# Patient Record
Sex: MALE | Race: WHITE | Employment: UNEMPLOYED | URBAN - METROPOLITAN AREA
[De-identification: names, ages, dates, MRNs, and addresses within clinical notes are randomized per-mention and may not be internally consistent; named-entity substitution may affect disease eponyms.]

---

## 2018-07-24 ENCOUNTER — HOSPITAL ENCOUNTER (OUTPATIENT)
Dept: ULTRASOUND IMAGING | Age: 48
Discharge: HOME | End: 2018-07-26
Payer: MEDICAID

## 2018-07-24 DIAGNOSIS — I82.90 THROMBOSIS: ICD-10-CM

## 2018-07-24 PROCEDURE — 93970 EXTREMITY STUDY: CPT

## 2019-02-04 ENCOUNTER — HOSPITAL ENCOUNTER (OUTPATIENT)
Dept: ULTRASOUND IMAGING | Age: 49
Discharge: HOME | End: 2019-02-06
Payer: MEDICAID

## 2019-02-04 DIAGNOSIS — R09.89 BRUIT OF LEFT CAROTID ARTERY: ICD-10-CM

## 2019-02-04 PROCEDURE — 93880 EXTRACRANIAL BILAT STUDY: CPT

## 2019-02-14 ENCOUNTER — OFFICE VISIT (OUTPATIENT)
Dept: VASCULAR SURGERY | Age: 49
End: 2019-02-14
Payer: MEDICAID

## 2019-02-14 VITALS — DIASTOLIC BLOOD PRESSURE: 82 MMHG | HEART RATE: 84 BPM | SYSTOLIC BLOOD PRESSURE: 126 MMHG

## 2019-02-14 DIAGNOSIS — I65.21 CAROTID STENOSIS, RIGHT: ICD-10-CM

## 2019-02-14 DIAGNOSIS — F17.200 TOBACCO DEPENDENCE: ICD-10-CM

## 2019-02-14 PROCEDURE — 99204 OFFICE O/P NEW MOD 45 MIN: CPT | Performed by: SURGERY

## 2019-02-14 PROCEDURE — G8421 BMI NOT CALCULATED: HCPCS | Performed by: SURGERY

## 2019-02-14 PROCEDURE — 1036F TOBACCO NON-USER: CPT | Performed by: SURGERY

## 2019-02-14 PROCEDURE — G8427 DOCREV CUR MEDS BY ELIG CLIN: HCPCS | Performed by: SURGERY

## 2019-02-14 PROCEDURE — G8598 ASA/ANTIPLAT THER USED: HCPCS | Performed by: SURGERY

## 2019-02-14 PROCEDURE — G8484 FLU IMMUNIZE NO ADMIN: HCPCS | Performed by: SURGERY

## 2019-02-14 RX ORDER — METHIMAZOLE 5 MG/1
30 TABLET ORAL 2 TIMES DAILY
COMMUNITY
Start: 2019-01-16

## 2019-02-14 RX ORDER — TIOTROPIUM BROMIDE AND OLODATEROL 3.124; 2.736 UG/1; UG/1
SPRAY, METERED RESPIRATORY (INHALATION)
COMMUNITY
Start: 2019-01-28 | End: 2019-09-10 | Stop reason: CLARIF

## 2019-02-14 RX ORDER — NICOTINE 21 MG/24HR
PATCH, TRANSDERMAL 24 HOURS TRANSDERMAL
Refills: 2 | COMMUNITY
Start: 2019-02-06

## 2019-02-14 RX ORDER — ASPIRIN 81 MG/1
81 TABLET ORAL DAILY
COMMUNITY

## 2020-02-13 ENCOUNTER — OFFICE VISIT (OUTPATIENT)
Dept: VASCULAR SURGERY | Age: 50
End: 2020-02-13
Payer: MEDICAID

## 2020-02-13 PROBLEM — Z87.891 HISTORY OF TOBACCO USE: Status: ACTIVE | Noted: 2020-02-13

## 2020-02-13 PROBLEM — F17.200 TOBACCO DEPENDENCE: Status: RESOLVED | Noted: 2019-02-14 | Resolved: 2020-02-13

## 2020-02-13 PROCEDURE — G8420 CALC BMI NORM PARAMETERS: HCPCS | Performed by: SURGERY

## 2020-02-13 PROCEDURE — 1036F TOBACCO NON-USER: CPT | Performed by: SURGERY

## 2020-02-13 PROCEDURE — 99213 OFFICE O/P EST LOW 20 MIN: CPT | Performed by: SURGERY

## 2020-02-13 PROCEDURE — G8484 FLU IMMUNIZE NO ADMIN: HCPCS | Performed by: SURGERY

## 2020-02-13 PROCEDURE — G8427 DOCREV CUR MEDS BY ELIG CLIN: HCPCS | Performed by: SURGERY

## 2020-02-13 NOTE — PROGRESS NOTES
Chief Complaint:   Chief Complaint   Patient presents with    Circulatory Problem     Follow up carotid stenosis         HPI: Patient came to the office, for the evaluation of carotid artery disease, overall doing well, except for breathing problem, due to chronic obstructive lung disease, stop smoking      Patient denies any focal lateralizing neurological symptoms like loss of speech, vision or loss of function of extremity    Patient can walk a few blocks , and denies any symptoms of rest pain    Allergies   Allergen Reactions    Ciprofloxacin Hives    Keflex [Cephalexin] Hives    Penicillins        Current Outpatient Medications   Medication Sig Dispense Refill    Albuterol (VENTOLIN IN) Inhale into the lungs      albuterol-ipratropium (COMBIVENT RESPIMAT)  MCG/ACT AERS inhaler Inhale 1 puff into the lungs every 6 hours      methimazole (TAPAZOLE) 5 MG tablet Take 5 mg by mouth 2 times daily      nicotine (NICODERM CQ) 14 MG/24HR   2    aspirin 81 MG EC tablet Take 81 mg by mouth daily      naproxen (NAPROSYN) 500 MG tablet Take 1 tablet by mouth 2 times daily for 7 days (Patient not taking: Reported on 2/13/2020) 14 tablet 0    albuterol (PROVENTIL) (2.5 MG/3ML) 0.083% nebulizer solution Take 2.5 mg by nebulization every 6 hours as needed. No current facility-administered medications for this visit. Past Medical History:   Diagnosis Date    Aortic valve stenosis     Carotid stenosis, right 2/14/2019    COPD (chronic obstructive pulmonary disease) (HCC)     Graves disease     History of tobacco use 2/13/2020    Tobacco dependence 2/14/2019       Past Surgical History:   Procedure Laterality Date    TONSILLECTOMY         History reviewed. No pertinent family history.     Social History     Socioeconomic History    Marital status:      Spouse name: Not on file    Number of children: Not on file    Years of education: Not on file    Highest education level: Not on organomegaly. Musculoskeletal : No joint effusions, tenderness swelling    Neuro: Speech is intact. Moving all extremities. No focal motor or sensory deficits      Extremities:  Both feet are warm to touch. The color of both feet is normal.        Pulses Right  Left    Brachial 3 3    Radial    3=normal   Femoral 2 2  2=diminished   Popliteal    1=barely palpable   Dorsalis pedis    0=absent   Posterior tibial    4=aneurysmal             Other pertinent information:1. The past medical records were reviewed. Assessment:    1. Carotid stenosis, right    2. History of tobacco use              Plan:       Discussed with the patient, recommended follow-up carotid ultrasound and call me PRN if any neurological symptoms              Patient was instructed to continue walking program and to call if any worsening of symptoms and to call if any focal lateralizing neurological symptoms like loss of speech, vision or loss of function of extremity. All the questions were answered. Orders Placed This Encounter   Procedures    US Carotid Artery Bilateral             Indicated follow-up: Return in about 1 year (around 2/13/2021), or if symptoms worsen or fail to improve.

## 2020-03-02 ENCOUNTER — TELEPHONE (OUTPATIENT)
Dept: VASCULAR SURGERY | Age: 50
End: 2020-03-02

## 2020-03-02 ENCOUNTER — HOSPITAL ENCOUNTER (OUTPATIENT)
Dept: CARDIOLOGY | Age: 50
Discharge: HOME | End: 2020-03-02
Payer: MEDICAID

## 2020-03-02 PROCEDURE — 93880 EXTRACRANIAL BILAT STUDY: CPT

## 2020-03-02 NOTE — PROGRESS NOTES
[facility] - [facility]    This is a pre read worksheet - prior to official physician interpretation    Martha Deven  1970  Date of study: 3/2/20  [id number]    Indication for study:  Carotid artery stenosis  Study : Bilateral Carotid Artery Duplex Examination    Duplex examination of the RIGHT carotid artery system identifies atherosclerotic plaque. The peak systolic velocity in internal carotid artery was 173 centimeters / second. The maximum end diastolic velocity was 49 centimeters / second. The ICA/CCA ratio is 1.1. The right vertebral artery has antegrade flow. Duplex examination of the LEFT carotid artery system identifies atherosclerotic plaque. The peak systolic velocity in internal carotid artery was 153 centimeters / second. The maximum end diastolic velocity was 53 centimeters / second. The ICA/CCA ratio is 1.0. The left vertebral artery has antegrade flow.     RIGHT 20%  LEFT < 20%   Both mid- distal ICAS very tortuous      LAST STUDY  2/4/2019

## 2020-03-02 NOTE — TELEPHONE ENCOUNTER
Message left for the patient regarding the carotid ultrasound testings, normal, call office to discuss further, no need for follow-up testing in the future unless patient has symptoms but to call office to discuss

## 2020-03-03 ENCOUNTER — TELEPHONE (OUTPATIENT)
Dept: VASCULAR SURGERY | Age: 50
End: 2020-03-03

## 2020-03-07 ENCOUNTER — TELEPHONE (OUTPATIENT)
Dept: VASCULAR SURGERY | Age: 50
End: 2020-03-07

## 2020-03-08 NOTE — PROCEDURES
[address]                  [address] [facility]  [facility]                                VASCULAR REPORT    PATIENT NAME: Lamberto Ulrich                   :        1970  MED REC NO:   [id number]                            ROOM:  ACCOUNT NO:   [de-identified]                           ADMIT DATE: 2020  PROVIDER:     Germaine Healy MD    DATE OF PROCEDURE:  2020    CAROTID ULTRASOUND REPORT    INDICATION:  Carotid bruit. FINDINGS:  Duplex scanning of the right carotid artery revealed the  patient has moderate intimal thickening with a peak internal carotid  velocity of 044, diastolic velocity of 49 cm/sec with tortous right  internal carotid artery, with stenosis of 20%. Duplex scanning of the left carotid artery revealed again mild intimal  thickening with a peak internal carotid velocity of 548, diastolic  velocity of 53 cm/sec with minimal stenosis of less than 20%. IMPRESSION:  Right carotid artery stenosis of 20%, left carotid artery  stenosis of less than 20%, increased velocity due to combination of  markedly tortous internal carotid arteries. Freddy Garcia MD    D: 2020 16:40:05       T: 2020 20:04:59     [email or web address]  Job#: [id number]     Doc#: [id number]    CC:   Britta Schroeder Iii, DO

## 2022-04-12 ENCOUNTER — HOSPITAL ENCOUNTER (EMERGENCY)
Age: 52
Discharge: HOME | End: 2022-04-12
Payer: MEDICAID

## 2022-04-12 ENCOUNTER — APPOINTMENT (OUTPATIENT)
Dept: ULTRASOUND IMAGING | Age: 52
End: 2022-04-12
Payer: MEDICAID

## 2022-04-12 VITALS
WEIGHT: 159 LBS | DIASTOLIC BLOOD PRESSURE: 66 MMHG | RESPIRATION RATE: 16 BRPM | OXYGEN SATURATION: 98 % | TEMPERATURE: 97.8 F | HEIGHT: 72 IN | SYSTOLIC BLOOD PRESSURE: 143 MMHG | BODY MASS INDEX: 21.54 KG/M2 | HEART RATE: 103 BPM

## 2022-04-12 DIAGNOSIS — R60.0 EDEMA OF LEFT LOWER EXTREMITY: Primary | ICD-10-CM

## 2022-04-12 PROCEDURE — 93971 EXTREMITY STUDY: CPT

## 2022-04-12 PROCEDURE — 99281 EMR DPT VST MAYX REQ PHY/QHP: CPT

## 2022-04-12 NOTE — Clinical Note
Sherri Tirado was seen and treated in our emergency department on 4/12/2022. He may return to work on 04/13/2022. If you have any questions or concerns, please don't hesitate to call.       Eun Rodriguez PA-C

## 2022-04-13 NOTE — ED PROVIDER NOTES
[facility]     Department of Emergency Medicine   ED  Encounter Note  Admit Date/RoomTime: No admission date for patient encounter. ED Room: Room/bed info not found    NAME: Mathew Rutledge  : 1970  MRN: [id number]     Chief Complaint:  Leg Swelling (LLE x1 week, denies injury and pain)    History of Present Illness       Mathew Rutledge is a 54 y.o. old male who presents to the emergency department by private vehicle, for non-traumatic Left foot and ankle swelling which occured 1 week(s) prior to arrival.   The complaint is due to no known cause. His weight bearing status is normal.  Patient has no prior history of pain/injury with regards to today's visit. Since onset the symptoms have been remaining constant. His pain is aggraveated by nothing and relieved by nothing. He denies any head injury, headache, loss of consciousness, confusion, dizziness, neck pain, chest pain, abdominal pain, back pain, extremity injury, numbness, weakness, blurred vision, nausea, vomiting, fever, chills, wounds or rash. ROS   Pertinent positives and negatives are stated within HPI, all other systems reviewed and are negative. Past Medical History:  has a past medical history of Aortic valve stenosis, Carotid stenosis, right, COPD (chronic obstructive pulmonary disease) ([address] Graves disease, History of tobacco use, and Tobacco dependence. Surgical History:  has a past surgical history that includes Tonsillectomy. Social History:  reports that he quit smoking about 3 years ago. His smoking use included cigarettes. He smoked 0.50 packs per day. He has never used smokeless tobacco. He reports that he does not drink alcohol and does not use drugs. Family History: family history is not on file.      Allergies: Ciprofloxacin, Keflex [cephalexin], and Penicillins    Physical Exam   Oxygen Saturation Interpretation: Normal.        ED Triage Vitals   BP Temp Temp src Pulse Resp SpO2 Height Weight   -- -- -- -- -- -- -- --         Constitutional:  Alert, development consistent with age. HEENT:  NC/NT. Airway patent. Neck:  Normal ROM. Supple. Left Lower Extremity(s):               Tenderness:  none. Swelling: Mild. Calf:  Negative Suresh's sign. No cords or calf tenderness. Calf/Ankle edema is present. .             Deformity: no deformity observed/palpated. ROM: full range of motion. Skin:  no wounds, erythema, or swelling. Neurovascular: Motor deficit: none. Sensory deficit: none. Pulse deficit: none. Capillary refill: normal.  Gait:  normal.  Lymphatics: No lymphangitis or adenopathy noted. Neurological:  Oriented x3. Motor functions intact. Lab / Imaging Results   (All laboratory and radiology results have been personally reviewed by myself)  Labs:  No results found for this visit on 04/12/22. Imaging: All Radiology results interpreted by Radiologist unless otherwise noted. US DUP LOWER EXTREMITY LEFT PENNY   Final Result   No evidence of DVT in the left lower extremity. RECOMMENDATIONS:   Unavailable           ED Course / Medical Decision Making   Medications - No data to display     Consults:   None    Procedure(s): None    MDM:      Discussed results with patient and his wife. He continues to deny any other symptoms other than the swelling in his left ankle. No pain. No injury. In a joint decision-making process we elected to forego any additional testing at this time. Will be given a prescription for compression stockings which he should use and follow-up with his primary care provider soon as possible. Patient also requesting the contact information for a cardiologist and was given the number for [facility] cardio. Preferably, he has been encouraged to follow up with his primary care provider and request a cardiologist during his appointment. Vital signs are stable.   He is very well-appearing appropriate discharge and outpatient follow-up. Specific conditions for emergent return of been described and he is to return immediately for any new worsening symptoms. Plan of Care/Counseling:  Sarah Sunshine PA-C reviewed today's visit with the patient in addition to providing specific details for the plan of care and counseling regarding the diagnosis and prognosis. Questions are answered at this time and are agreeable with the plan. Assessment      1. Edema of left lower extremity       Plan   Discharged home. Patient condition is good    New Medications     New Prescriptions    COMPRESSION STOCKINGS MISC    by Does not apply route     Electronically signed by Sarah Sunshine PA-C   DD: 4/12/22  **This report was transcribed using voice recognition software. Every effort was made to ensure accuracy; however, inadvertent computerized transcription errors may be present.   END OF ED PROVIDER NOTE        Sarah Sunshine PA-C  04/12/22 [id number]

## 2022-05-25 ENCOUNTER — OFFICE VISIT (OUTPATIENT)
Dept: CARDIOLOGY CLINIC | Age: 52
End: 2022-05-25
Payer: MEDICAID

## 2022-05-25 ENCOUNTER — NURSE ONLY (OUTPATIENT)
Dept: CARDIOLOGY CLINIC | Age: 52
End: 2022-05-25

## 2022-05-25 VITALS
RESPIRATION RATE: 20 BRPM | BODY MASS INDEX: 21.67 KG/M2 | SYSTOLIC BLOOD PRESSURE: 138 MMHG | WEIGHT: 160 LBS | DIASTOLIC BLOOD PRESSURE: 64 MMHG | HEART RATE: 89 BPM | HEIGHT: 72 IN

## 2022-05-25 DIAGNOSIS — I35.0 NONRHEUMATIC AORTIC VALVE STENOSIS: ICD-10-CM

## 2022-05-25 DIAGNOSIS — R00.2 PALPITATIONS: ICD-10-CM

## 2022-05-25 DIAGNOSIS — I65.21 CAROTID STENOSIS, RIGHT: Primary | ICD-10-CM

## 2022-05-25 PROCEDURE — 3017F COLORECTAL CA SCREEN DOC REV: CPT | Performed by: INTERNAL MEDICINE

## 2022-05-25 PROCEDURE — 1036F TOBACCO NON-USER: CPT | Performed by: INTERNAL MEDICINE

## 2022-05-25 PROCEDURE — G8420 CALC BMI NORM PARAMETERS: HCPCS | Performed by: INTERNAL MEDICINE

## 2022-05-25 PROCEDURE — 93000 ELECTROCARDIOGRAM COMPLETE: CPT | Performed by: INTERNAL MEDICINE

## 2022-05-25 PROCEDURE — G8427 DOCREV CUR MEDS BY ELIG CLIN: HCPCS | Performed by: INTERNAL MEDICINE

## 2022-05-25 PROCEDURE — 99203 OFFICE O/P NEW LOW 30 MIN: CPT | Performed by: INTERNAL MEDICINE

## 2022-05-25 RX ORDER — METOPROLOL SUCCINATE 25 MG/1
25 TABLET, EXTENDED RELEASE ORAL DAILY
Qty: 30 TABLET | Refills: 5 | Status: SHIPPED | OUTPATIENT
Start: 2022-05-25

## 2022-05-25 ASSESSMENT — ENCOUNTER SYMPTOMS
DIARRHEA: 0
BLOOD IN STOOL: 0
BACK PAIN: 0
SHORTNESS OF BREATH: 1
COUGH: 1
WHEEZING: 0
ABDOMINAL PAIN: 0
NAUSEA: 0
VOMITING: 0
CONSTIPATION: 0

## 2022-05-25 NOTE — PROGRESS NOTES
OUTPATIENT CARDIOLOGY CONSULT    Name: Carmelo Huddleston    Age: 50 y.o. Date of Service: 5/25/2022    Reason for Consultation:   Chief Complaint   Patient presents with    Edema     c/o left leg swelling        Referring Physician: Jyoti Green III, DO    History of Present Illness:  74-year-old ex smoker male who is referred for cardiac evaluation due to lower extremity edema. He is accompanied by his wife. Patient was followed up by Dr. Nancy Chaves in the past.  He has history of myocardial infarction more than 10 years ago, mild aortic stenosis, right carotid stenosis, COPD, and Graves' disease. Patient has not been active. He has been feeling chronic dyspnea on exertion. He feels little chest pressure/stabbing lasting 5 to 10 seconds with activity. He has been complaining of palpitations and dizziness. He denies syncope. He presented to the emergency room on 4/12/2021 due to left lower extremity edema. He underwent venous ultrasound which revealed no DVT. EKG done today revealed sinus rhythm at 89 bpm, left atrial enlargement, left anterior fascicular block, left ventricular hypertrophy, cannot exclude anterolateral myocardial infarction of unknown age and artifacts. Review of Systems:  Review of Systems   Constitutional: Negative for chills, fatigue and fever. Shaking. HENT: Negative for nosebleeds. Respiratory: Positive for cough and shortness of breath. Negative for wheezing. Gastrointestinal: Negative for abdominal pain, blood in stool, constipation, diarrhea, nausea and vomiting. Genitourinary: Negative for dysuria and hematuria. Musculoskeletal: Negative for back pain, joint swelling and myalgias. Aching. Neurological: Negative for syncope and light-headedness. Psychiatric/Behavioral: The patient is not nervous/anxious.            Past Medical History:  Past Medical History:   Diagnosis Date    Aortic valve stenosis     Carotid stenosis, right 2/14/2019 Emotionally Abused: Not on file    Physically Abused: Not on file    Sexually Abused: Not on file   Housing Stability:     Unable to Pay for Housing in the Last Year: Not on file    Number of Places Lived in the Last Year: Not on file    Unstable Housing in the Last Year: Not on file       Allergies:   Allergies   Allergen Reactions    Ciprofloxacin Hives    Keflex [Cephalexin] Hives    Penicillins        Current Medications:  Current Outpatient Medications   Medication Sig Dispense Refill    tiotropium (SPIRIVA RESPIMAT) 2.5 MCG/ACT AERS inhaler Inhale 2 puffs into the lungs daily 1 each 5    albuterol sulfate HFA (VENTOLIN HFA) 108 (90 Base) MCG/ACT inhaler Inhale 2 puffs into the lungs every 6 hours as needed for Wheezing 18 g 5    methimazole (TAPAZOLE) 5 MG tablet Take 30 mg by mouth 2 times daily       aspirin 81 MG EC tablet Take 81 mg by mouth daily      ipratropium-albuterol (DUONEB) 0.5-2.5 (3) MG/3ML SOLN nebulizer solution Inhale 3 mLs into the lungs every 4 hours as needed for Shortness of Breath (Patient not taking: Reported on 5/25/2022) 360 mL 1    budesonide-formoterol (SYMBICORT) 160-4.5 MCG/ACT AERO Inhale 2 puffs into the lungs 2 times daily (Patient not taking: Reported on 5/25/2022) 1 each 5    Compression Stockings MISC by Does not apply route 1 each 0    albuterol-ipratropium (COMBIVENT RESPIMAT)  MCG/ACT AERS inhaler Inhale 1 puff into the lungs 2 times daily (Patient not taking: Reported on 5/25/2022) 4 g 5    umeclidinium-vilanterol (ANORO ELLIPTA) 62.5-25 MCG/INH AEPB inhaler Inhale 1 puff into the lungs daily (Patient not taking: Reported on 5/25/2022) 1 puff 5    propranolol (INDERAL) 40 MG tablet Take 40 mg by mouth daily (Patient not taking: Reported on 5/25/2022)      Albuterol (VENTOLIN IN) Inhale into the lungs       nicotine (NICODERM CQ) 14 MG/24HR  (Patient not taking: Reported on 5/20/2021)  2    naproxen (NAPROSYN) 500 MG tablet Take 1 tablet by mouth 2 times daily for 7 days (Patient not taking: Reported on 2/13/2020) 14 tablet 0    albuterol (PROVENTIL) (2.5 MG/3ML) 0.083% nebulizer solution Take 2.5 mg by nebulization every 6 hours as needed. (Patient not taking: Reported on 5/25/2022)       No current facility-administered medications for this visit. Physical Exam:  /64   Pulse 89   Resp 20   Ht 6' (1.829 m)   Wt 160 lb (72.6 kg)   BMI 21.70 kg/m²   Wt Readings from Last 3 Encounters:   05/25/22 160 lb (72.6 kg)   05/25/22 162 lb (73.5 kg)   04/12/22 159 lb (72.1 kg)     Physical Exam:  /64   Pulse 89   Resp 20   Ht 6' (1.829 m)   Wt 160 lb (72.6 kg)   BMI 21.70 kg/m²   Wt Readings from Last 3 Encounters:   05/25/22 160 lb (72.6 kg)   05/25/22 162 lb (73.5 kg)   04/12/22 159 lb (72.1 kg)     Physical Exam  Constitutional:       General: He is not in acute distress. Appearance: He is well-developed. HENT:      Head: Normocephalic and atraumatic. Neck:      Vascular: No carotid bruit or JVD. Comments: A large goiter is noted. There is bilateral carotid bruit versus radiating heart murmur. Cardiovascular:      Rate and Rhythm: Normal rate and regular rhythm. Heart sounds: No murmur heard. No friction rub. No gallop. Comments: Distant heart sounds. Pulmonary:      Breath sounds: No wheezing or rales. Comments: Fair air entry with no wheezing or crackles. Chest:      Chest wall: No tenderness. Abdominal:      General: Bowel sounds are normal. There is no distension. Palpations: Abdomen is soft. There is no mass. Tenderness: There is no abdominal tenderness. Comments: No abdominal bruit. Musculoskeletal:      Cervical back: Neck supple. Right lower leg: No edema. Left lower leg: No edema. Skin:     General: Skin is warm and dry. Neurological:      Mental Status: He is alert and oriented to person, place, and time.           Laboratory Tests:  Lab Results   Component Value Date    CREATININE 0.6 (L) 10/26/2017    BUN 10 10/26/2017     10/26/2017    K 4.4 10/26/2017     10/26/2017    CO2 24 10/26/2017       Lab Results   Component Value Date    WBC 10.4 10/26/2017    HGB 14.4 10/26/2017    HCT 41.2 10/26/2017    MCV 82.9 10/26/2017     10/26/2017     Lab Results   Component Value Date    ALT 33 10/26/2017    AST 21 10/26/2017    ALKPHOS 140 (H) 10/26/2017    BILITOT 0.6 10/26/2017     Lab Results   Component Value Date    CKTOTAL 168 02/29/2012    CKMB 0.9 02/29/2012    TROPONINI <0.01 02/29/2012     Lab Results   Component Value Date    INR 1.1 10/26/2017    INR 1.1 02/29/2012    PROTIME 11.4 10/26/2017    PROTIME 10.8 02/29/2012     Lab Results   Component Value Date    TSH <0.005 (L) 10/26/2017       Cardiac Tests:  Venous ultrasound of the left leg done on 4/12/2022 revealed no DVT. ASSESSMENT / PLAN:  -Palpitations: Could be due to sinus tachycardia, skip beats or PAF due to hyper thyroidism.  -Dyspnea on exertion: Probably due to underlying COPD. -Chest discomfort: It sounded atypical in nature.  -History of mild aortic stenosis.  -History of right carotid stenosis:  -Goiter and Graves' disease: On Tapazole  -COPD. -Ex smoking. Will continue aspirin. Start patient on Toprol 25 mg daily. We will arrange for the patient to have 1 week ZIO XT monitor to rule out significant arrhythmia. Will arrange for the patient to have an echocardiogram to reassess his aortic stenosis, and assess his left and right ventricular systolic function and RV systolic pressure. Will arrange for the patient to have carotid ultrasound to rule out carotid stenosis. Patient was advised to have low-salt diet and leg elevation at home if developing lower extremity edema. Will follow-up at the office in 1 year. Thank you for allowing me to participate in your patient's care. Please feel free to contact me if you have any questions or concerns.     Cody Tucker MD [facility] [place], [address] [facility]

## 2022-06-03 ENCOUNTER — HOSPITAL ENCOUNTER (OUTPATIENT)
Dept: ULTRASOUND IMAGING | Age: 52
Discharge: HOME | End: 2022-06-05
Payer: MEDICAID

## 2022-06-03 DIAGNOSIS — I65.21 CAROTID STENOSIS, RIGHT: ICD-10-CM

## 2022-06-03 PROCEDURE — 93880 EXTRACRANIAL BILAT STUDY: CPT | Performed by: RADIOLOGY

## 2022-06-03 PROCEDURE — 93880 EXTRACRANIAL BILAT STUDY: CPT

## 2022-06-14 DIAGNOSIS — R00.2 PALPITATIONS: ICD-10-CM

## 2022-09-19 ENCOUNTER — HOSPITAL ENCOUNTER (OUTPATIENT)
Dept: CARDIOLOGY | Age: 52
Discharge: HOME | End: 2022-09-19
Payer: MEDICAID

## 2022-09-19 DIAGNOSIS — I35.0 NONRHEUMATIC AORTIC VALVE STENOSIS: ICD-10-CM

## 2022-09-19 LAB
LV EF: 63 %
LVEF MODALITY: NORMAL

## 2022-09-19 PROCEDURE — 93306 TTE W/DOPPLER COMPLETE: CPT

## 2022-11-12 DIAGNOSIS — R00.2 PALPITATIONS: ICD-10-CM

## 2022-11-21 RX ORDER — METOPROLOL SUCCINATE 25 MG/1
TABLET, EXTENDED RELEASE ORAL
Qty: 30 TABLET | Refills: 5 | Status: SHIPPED | OUTPATIENT
Start: 2022-11-21

## 2023-05-02 ENCOUNTER — HOSPITAL ENCOUNTER (OUTPATIENT)
Dept: NUCLEAR MEDICINE | Age: 53
Discharge: HOME | End: 2023-05-02
Payer: MEDICAID

## 2023-05-02 ENCOUNTER — APPOINTMENT (OUTPATIENT)
Dept: NUCLEAR MEDICINE | Age: 53
End: 2023-05-02
Payer: MEDICAID

## 2023-05-02 DIAGNOSIS — R94.6 ABNORMAL FINDING ON THYROID FUNCTION TEST: ICD-10-CM

## 2023-05-02 PROCEDURE — A9517 I131 IODIDE CAP, RX: HCPCS | Performed by: RADIOLOGY

## 2023-05-02 PROCEDURE — 3430000000 HC RX DIAGNOSTIC RADIOPHARMACEUTICAL: Performed by: RADIOLOGY

## 2023-05-02 PROCEDURE — 79005 NUCLEAR RX ORAL ADMIN: CPT

## 2023-05-02 RX ADMIN — SODIUM IODIDE I 131 12 MILLICURIE: 1 CAPSULE, GELATIN COATED ORAL at 12:33

## 2023-05-08 ENCOUNTER — OFFICE VISIT (OUTPATIENT)
Dept: CARDIOLOGY CLINIC | Age: 53
End: 2023-05-08

## 2023-05-08 VITALS
HEIGHT: 72 IN | DIASTOLIC BLOOD PRESSURE: 52 MMHG | OXYGEN SATURATION: 96 % | HEART RATE: 87 BPM | SYSTOLIC BLOOD PRESSURE: 120 MMHG | BODY MASS INDEX: 22.27 KG/M2 | RESPIRATION RATE: 16 BRPM | WEIGHT: 164.4 LBS

## 2023-05-08 DIAGNOSIS — R00.2 PALPITATIONS: ICD-10-CM

## 2023-05-08 DIAGNOSIS — I38 VALVULAR HEART DISEASE: Primary | ICD-10-CM

## 2023-05-08 DIAGNOSIS — R07.9 CHEST PAIN, UNSPECIFIED TYPE: ICD-10-CM

## 2023-05-08 RX ORDER — METOPROLOL SUCCINATE 50 MG/1
50 TABLET, EXTENDED RELEASE ORAL DAILY
Qty: 30 TABLET | Refills: 5 | Status: SHIPPED | OUTPATIENT
Start: 2023-05-08

## 2023-05-08 RX ORDER — ATORVASTATIN CALCIUM 40 MG/1
40 TABLET, FILM COATED ORAL DAILY
Qty: 30 TABLET | Refills: 5 | Status: SHIPPED | OUTPATIENT
Start: 2023-05-08

## 2023-05-08 ASSESSMENT — ENCOUNTER SYMPTOMS
CONSTIPATION: 0
NAUSEA: 0
BLOOD IN STOOL: 0
SHORTNESS OF BREATH: 1
ABDOMINAL PAIN: 0
DIARRHEA: 0
COUGH: 0
WHEEZING: 0
BACK PAIN: 0
VOMITING: 0

## 2023-05-08 NOTE — PROGRESS NOTES
being of 4 beats at 164 bpm and the longest being of 8 beats at 109 bpm.    ASSESSMENT / PLAN:  -Palpitations: Due to PSVT due to hyperthyroidism and excessive drinking of [employer] [address]. -Dyspnea on exertion: Probably due to underlying COPD. -Chest pain: It sounded atypical in nature. However patient might have underlying CAD due to prior smoking and mild carotid disease.  -Bifascicular block. -Bilateral mild  carotid stenosis. -Goiter and Graves' disease: On Tapazole  -COPD. -Ex smoking. We will continue aspirin. We will start him on atorvastatin 40 mg daily. I will increase his Toprol to 50 mg daily. We will arrange for the patient to have a Lexiscan to rule out significant myocardial ischemia. Patient was asked to cut down on drinking [employer] [address]. We will follow-up in my office in 6 months. The patient's current medication list, allergies, problem list and results of all previously ordered testing were reviewed at today's visit. Yeimi Adamson MD , [facility] [place].   [facility]) Cardiology

## 2023-05-19 ENCOUNTER — TELEPHONE (OUTPATIENT)
Dept: CARDIOLOGY | Age: 53
End: 2023-05-19

## 2023-05-19 NOTE — TELEPHONE ENCOUNTER
Spoke w/ wife, Nori George, to confirm stress test for Monday, 5/22/23, starting at [place] [address]. Instructions given and medications reviewed. Instructed no medication holds. All questions answered.

## 2023-05-22 ENCOUNTER — HOSPITAL ENCOUNTER (OUTPATIENT)
Dept: CARDIOLOGY | Age: 53
Discharge: HOME | End: 2023-05-22
Payer: MEDICAID

## 2023-05-22 VITALS — BODY MASS INDEX: 22.21 KG/M2 | WEIGHT: 164 LBS | HEIGHT: 72 IN

## 2023-05-22 DIAGNOSIS — R07.9 CHEST PAIN, UNSPECIFIED TYPE: ICD-10-CM

## 2023-05-22 PROCEDURE — 2580000003 HC RX 258: Performed by: INTERNAL MEDICINE

## 2023-05-22 PROCEDURE — A9502 TC99M TETROFOSMIN: HCPCS | Performed by: INTERNAL MEDICINE

## 2023-05-22 PROCEDURE — 3430000000 HC RX DIAGNOSTIC RADIOPHARMACEUTICAL: Performed by: INTERNAL MEDICINE

## 2023-05-22 RX ORDER — SODIUM CHLORIDE 0.9 % (FLUSH) 0.9 %
10 SYRINGE (ML) INJECTION PRN
Status: DISCONTINUED | OUTPATIENT
Start: 2023-05-22 | End: 2023-05-23 | Stop reason: HOSPADM

## 2023-05-22 RX ADMIN — TETROFOSMIN 8.8 MILLICURIE: 0.23 INJECTION, POWDER, LYOPHILIZED, FOR SOLUTION INTRAVENOUS at 07:51

## 2023-05-22 RX ADMIN — SODIUM CHLORIDE, PRESERVATIVE FREE 10 ML: 5 INJECTION INTRAVENOUS at 07:51

## 2023-05-23 DIAGNOSIS — R00.2 PALPITATIONS: ICD-10-CM

## 2023-05-23 RX ORDER — METOPROLOL SUCCINATE 25 MG/1
TABLET, EXTENDED RELEASE ORAL
Qty: 30 TABLET | Refills: 5 | Status: SHIPPED | OUTPATIENT
Start: 2023-05-23

## 2023-05-24 ENCOUNTER — HOSPITAL ENCOUNTER (OUTPATIENT)
Dept: CARDIOLOGY | Age: 53
Discharge: HOME | End: 2023-05-22
Payer: MEDICAID

## 2023-05-24 ENCOUNTER — HOSPITAL ENCOUNTER (OUTPATIENT)
Dept: CARDIOLOGY | Age: 53
Discharge: HOME | End: 2023-05-24
Payer: MEDICAID

## 2023-05-24 VITALS
WEIGHT: 164 LBS | SYSTOLIC BLOOD PRESSURE: 120 MMHG | BODY MASS INDEX: 22.21 KG/M2 | DIASTOLIC BLOOD PRESSURE: 70 MMHG | HEIGHT: 72 IN | HEART RATE: 70 BPM | RESPIRATION RATE: 16 BRPM

## 2023-05-24 DIAGNOSIS — R07.9 CHEST PAIN, UNSPECIFIED TYPE: ICD-10-CM

## 2023-05-24 PROCEDURE — 93017 CV STRESS TEST TRACING ONLY: CPT

## 2023-05-24 PROCEDURE — 6360000002 HC RX W HCPCS: Performed by: INTERNAL MEDICINE

## 2023-05-24 PROCEDURE — 2580000003 HC RX 258: Performed by: INTERNAL MEDICINE

## 2023-05-24 PROCEDURE — 3430000000 HC RX DIAGNOSTIC RADIOPHARMACEUTICAL: Performed by: INTERNAL MEDICINE

## 2023-05-24 PROCEDURE — A9502 TC99M TETROFOSMIN: HCPCS | Performed by: INTERNAL MEDICINE

## 2023-05-24 PROCEDURE — 78452 HT MUSCLE IMAGE SPECT MULT: CPT

## 2023-05-24 RX ORDER — SODIUM CHLORIDE 0.9 % (FLUSH) 0.9 %
10 SYRINGE (ML) INJECTION PRN
Status: DISCONTINUED | OUTPATIENT
Start: 2023-05-24 | End: 2023-05-25 | Stop reason: HOSPADM

## 2023-05-24 RX ADMIN — SODIUM CHLORIDE, PRESERVATIVE FREE 10 ML: 5 INJECTION INTRAVENOUS at 09:57

## 2023-05-24 RX ADMIN — SODIUM CHLORIDE, PRESERVATIVE FREE 10 ML: 5 INJECTION INTRAVENOUS at 07:48

## 2023-05-24 RX ADMIN — TETROFOSMIN 8.6 MILLICURIE: 0.23 INJECTION, POWDER, LYOPHILIZED, FOR SOLUTION INTRAVENOUS at 07:48

## 2023-05-24 RX ADMIN — TETROFOSMIN 25.1 MILLICURIE: 0.23 INJECTION, POWDER, LYOPHILIZED, FOR SOLUTION INTRAVENOUS at 09:57

## 2023-05-24 RX ADMIN — REGADENOSON 0.4 MG: 0.08 INJECTION, SOLUTION INTRAVENOUS at 09:57

## 2023-05-24 RX ADMIN — SODIUM CHLORIDE, PRESERVATIVE FREE 10 ML: 5 INJECTION INTRAVENOUS at 09:56

## 2023-05-24 NOTE — PROCEDURES
[id number] - [address]. [facility], [address] [place] [address]  [phone]               Pharmacologic Stress Nuclear Gated SPECT Study    Name: [id number] Account Number: [de-identified]    :  1970          Sex: male         Date of Study:  2023    Height: 6' (182.9 cm)         Weight - Scale: 164 lb (74.4 kg)     Ordering Provider: Alex Saez MD          PCP: Sonu Abbott III, DO      Cardiologist: Alex Saez MD             Interpreting Physician: Alex Saez MD  _________________________________________________________________________________    Indication:   Evaluation of extent and severity of coronary artery disease    Clinical History:   Patient has prior history of coronary artery disease. Resting ECG: Sinus rhythm at 76 bpm.      Procedure:   Pharmacologic stress testing was performed with regadenoson 0.4 mg for 15 seconds. The heart rate was 76 at baseline and ramírez to 121 beats during the infusion. The blood pressure at baseline was 120/70 and blood pressure at the end of infusion was 142/80. Blood pressure response was normal during the stress procedure. The patient experienced shortness of breath during the infusion. ECG during the infusion revealed no ischemia. IMAGING: Myocardial perfusion imaging was performed at rest 30-35 minutes following the intravenous injection of 8.6 mCi of (Tc-tetrofosmin) followed by 10 ml of Normal Saline. As per infusion protocol, the patient was injected intravenously with 25.1 mCi of (Tc-tetrofosmin) followed by 10 ml of Normal Saline. Gated post-stress tomographic imaging was performed 45 minutes after stress. FINDINGS: The overall quality of the study was poor due to nondiagnostic resting imaging. Left ventricular cavity size was noted to be normal in size. TID ? Rotational analog analysis demonstrated significant increase in uptake outside the heart.     The gated

## 2023-05-24 NOTE — DISCHARGE INSTRUCTIONS
[address]      Instructions to Patients    The following are the instructions for patients who have had a procedure in our office today. Patient name: Jenni Curling    Radionuclide Activity: 40mCi of 99mTc-Tetrofosmin (Myoview)    Date Administered: 5/24/2023    Expires: 48 hours after scheduled appointment time      Patient may resume normal activity unless otherwise instructed. Patient may resume medications as normal.  If the need should arise, patient may call [phone] between the hours of 8:00am-4:30pm.  After hours there is at least one physician on-call at all times for those patients needing assistance. Patients may call [phone] and the answering service will direct the patient to one of our physicians for assistance. After the patient's test if they are going to be leaving from an airport in the near future they should take this letter with them to verify the test and radionuclide used for their test.      This letter verifies that the above named bearer received an injection of a radionuclide for medical purpose/usage only.         Electronically signed by Jayashree Garber on 5/24/2023 at 9:56 AM

## 2023-05-31 ENCOUNTER — TELEPHONE (OUTPATIENT)
Dept: CARDIOLOGY CLINIC | Age: 53
End: 2023-05-31

## 2023-07-18 ENCOUNTER — HOSPITAL ENCOUNTER (OUTPATIENT)
Dept: CT IMAGING | Age: 53
Discharge: HOME | End: 2023-07-20
Payer: MEDICAID

## 2023-07-18 ENCOUNTER — HOSPITAL ENCOUNTER (OUTPATIENT)
Dept: PULMONOLOGY | Age: 53
Discharge: HOME | End: 2023-07-18
Payer: MEDICAID

## 2023-07-18 DIAGNOSIS — J44.9 CHRONIC OBSTRUCTIVE PULMONARY DISEASE, UNSPECIFIED COPD TYPE (HCC): ICD-10-CM

## 2023-07-18 PROCEDURE — 71271 CT THORAX LUNG CANCER SCR C-: CPT

## 2023-07-18 PROCEDURE — 94060 EVALUATION OF WHEEZING: CPT

## 2023-07-18 PROCEDURE — 94726 PLETHYSMOGRAPHY LUNG VOLUMES: CPT

## 2023-07-18 PROCEDURE — 94729 DIFFUSING CAPACITY: CPT

## 2023-07-27 ENCOUNTER — TELEPHONE (OUTPATIENT)
Dept: CASE MANAGEMENT | Age: 53
End: 2023-07-27

## 2023-07-27 NOTE — TELEPHONE ENCOUNTER
No call, encounter opened to process CT Lung Screening. CT Lung Screen: 7/18/2023    IMPRESSION:  1. There is no pulmonary infiltrate, mass or suspicious pulmonary nodule. 2. Mild emphysematous changes     LUNG RADS:  Lung-RADS 1 - Negative ()     Management:  12 month screening LDCT     RECOMMENDATIONS:  If you would like to register your patient with the [facility], please contact the Nurse Navigator at  [phone]. Pack years: 27    Social History     Tobacco Use  Smoking Status: Former Smoker    Start Date:    Quit Date: 02/07/2019   Types: Cigarettes   Packs/Day: 1   Years: 27   Pack Years: 27   Smokeless Tobacco: Former         Results letter sent to patient via my chart or mailed.      [address]

## 2023-07-30 ENCOUNTER — APPOINTMENT (OUTPATIENT)
Dept: GENERAL RADIOLOGY | Age: 53
End: 2023-07-30
Payer: MEDICAID

## 2023-07-30 ENCOUNTER — HOSPITAL ENCOUNTER (EMERGENCY)
Age: 53
Discharge: HOME | End: 2023-07-31
Payer: MEDICAID

## 2023-07-30 ENCOUNTER — APPOINTMENT (OUTPATIENT)
Dept: CT IMAGING | Age: 53
End: 2023-07-30
Payer: MEDICAID

## 2023-07-30 DIAGNOSIS — E87.6 HYPOKALEMIA: ICD-10-CM

## 2023-07-30 DIAGNOSIS — K31.89 GASTRIC WALL THICKENING: Primary | ICD-10-CM

## 2023-07-30 DIAGNOSIS — R11.2 NAUSEA AND VOMITING, UNSPECIFIED VOMITING TYPE: ICD-10-CM

## 2023-07-30 LAB
ALBUMIN SERPL-MCNC: 3.9 G/DL (ref 3.5–5.2)
ALP SERPL-CCNC: 87 U/L (ref 40–129)
ALT SERPL-CCNC: 10 U/L (ref 0–40)
ANION GAP SERPL CALCULATED.3IONS-SCNC: 13 MMOL/L (ref 7–16)
AST SERPL-CCNC: 9 U/L (ref 0–39)
BASOPHILS # BLD: 0.03 K/UL (ref 0–0.2)
BASOPHILS NFR BLD: 0 % (ref 0–2)
BILIRUB SERPL-MCNC: 1.2 MG/DL (ref 0–1.2)
BUN SERPL-MCNC: 20 MG/DL (ref 6–20)
CALCIUM SERPL-MCNC: 9.3 MG/DL (ref 8.6–10.2)
CHLORIDE SERPL-SCNC: 93 MMOL/L (ref 98–107)
CO2 SERPL-SCNC: 31 MMOL/L (ref 22–29)
CREAT SERPL-MCNC: 0.7 MG/DL (ref 0.7–1.2)
D DIMER: 1544 NG/ML DDU (ref 0–232)
EOSINOPHIL # BLD: 0.01 K/UL (ref 0.05–0.5)
EOSINOPHILS RELATIVE PERCENT: 0 % (ref 0–6)
ERYTHROCYTE [DISTWIDTH] IN BLOOD BY AUTOMATED COUNT: 12.9 % (ref 11.5–15)
FLUAV RNA RESP QL NAA+PROBE: NOT DETECTED
FLUBV RNA RESP QL NAA+PROBE: NOT DETECTED
GFR SERPL CREATININE-BSD FRML MDRD: >60 ML/MIN/1.73M2
GLUCOSE SERPL-MCNC: 119 MG/DL (ref 74–99)
HCT VFR BLD AUTO: 38.4 % (ref 37–54)
HGB BLD-MCNC: 13.9 G/DL (ref 12.5–16.5)
IMM GRANULOCYTES # BLD AUTO: 0.11 K/UL (ref 0–0.58)
IMM GRANULOCYTES NFR BLD: 1 % (ref 0–5)
LACTATE BLDV-SCNC: 1.1 MMOL/L (ref 0.5–2.2)
LIPASE SERPL-CCNC: 13 U/L (ref 13–60)
LYMPHOCYTES NFR BLD: 2.3 K/UL (ref 1.5–4)
LYMPHOCYTES RELATIVE PERCENT: 13 % (ref 20–42)
MAGNESIUM SERPL-MCNC: 1.9 MG/DL (ref 1.6–2.6)
MCH RBC QN AUTO: 29.4 PG (ref 26–35)
MCHC RBC AUTO-ENTMCNC: 36.2 G/DL (ref 32–34.5)
MCV RBC AUTO: 81.2 FL (ref 80–99.9)
MONOCYTES NFR BLD: 1.59 K/UL (ref 0.1–0.95)
MONOCYTES NFR BLD: 9 % (ref 2–12)
NEUTROPHILS NFR BLD: 76 % (ref 43–80)
NEUTS SEG NFR BLD: 13.07 K/UL (ref 1.8–7.3)
PLATELET # BLD AUTO: 189 K/UL (ref 130–450)
PMV BLD AUTO: 10.4 FL (ref 7–12)
POTASSIUM SERPL-SCNC: 3.4 MMOL/L (ref 3.5–5)
PROT SERPL-MCNC: 6.5 G/DL (ref 6.4–8.3)
RBC # BLD AUTO: 4.73 M/UL (ref 3.8–5.8)
SARS-COV-2 RNA RESP QL NAA+PROBE: NOT DETECTED
SODIUM SERPL-SCNC: 137 MMOL/L (ref 132–146)
SOURCE: NORMAL
SPECIMEN DESCRIPTION: NORMAL
TROPONIN I SERPL HS-MCNC: 7 NG/L (ref 0–11)
WBC OTHER # BLD: 17.1 K/UL (ref 4.5–11.5)

## 2023-07-30 PROCEDURE — 81001 URINALYSIS AUTO W/SCOPE: CPT

## 2023-07-30 PROCEDURE — 71275 CT ANGIOGRAPHY CHEST: CPT

## 2023-07-30 PROCEDURE — 83690 ASSAY OF LIPASE: CPT

## 2023-07-30 PROCEDURE — 84439 ASSAY OF FREE THYROXINE: CPT

## 2023-07-30 PROCEDURE — 74177 CT ABD & PELVIS W/CONTRAST: CPT

## 2023-07-30 PROCEDURE — 93005 ELECTROCARDIOGRAM TRACING: CPT | Performed by: NURSE PRACTITIONER

## 2023-07-30 PROCEDURE — 96374 THER/PROPH/DIAG INJ IV PUSH: CPT

## 2023-07-30 PROCEDURE — 85027 COMPLETE CBC AUTOMATED: CPT

## 2023-07-30 PROCEDURE — 84484 ASSAY OF TROPONIN QUANT: CPT

## 2023-07-30 PROCEDURE — 87636 SARSCOV2 & INF A&B AMP PRB: CPT

## 2023-07-30 PROCEDURE — 2580000003 HC RX 258: Performed by: NURSE PRACTITIONER

## 2023-07-30 PROCEDURE — 83605 ASSAY OF LACTIC ACID: CPT

## 2023-07-30 PROCEDURE — 99285 EMERGENCY DEPT VISIT HI MDM: CPT

## 2023-07-30 PROCEDURE — 6360000004 HC RX CONTRAST MEDICATION: Performed by: RADIOLOGY

## 2023-07-30 PROCEDURE — 84443 ASSAY THYROID STIM HORMONE: CPT

## 2023-07-30 PROCEDURE — 71046 X-RAY EXAM CHEST 2 VIEWS: CPT

## 2023-07-30 PROCEDURE — 6370000000 HC RX 637 (ALT 250 FOR IP): Performed by: NURSE PRACTITIONER

## 2023-07-30 PROCEDURE — 80053 COMPREHEN METABOLIC PANEL: CPT

## 2023-07-30 PROCEDURE — 6360000002 HC RX W HCPCS: Performed by: NURSE PRACTITIONER

## 2023-07-30 PROCEDURE — 85379 FIBRIN DEGRADATION QUANT: CPT

## 2023-07-30 PROCEDURE — 96375 TX/PRO/DX INJ NEW DRUG ADDON: CPT

## 2023-07-30 PROCEDURE — 83735 ASSAY OF MAGNESIUM: CPT

## 2023-07-30 RX ORDER — 0.9 % SODIUM CHLORIDE 0.9 %
1000 INTRAVENOUS SOLUTION INTRAVENOUS ONCE
Status: COMPLETED | OUTPATIENT
Start: 2023-07-30 | End: 2023-07-30

## 2023-07-30 RX ORDER — ONDANSETRON 2 MG/ML
4 INJECTION INTRAMUSCULAR; INTRAVENOUS ONCE
Status: COMPLETED | OUTPATIENT
Start: 2023-07-30 | End: 2023-07-30

## 2023-07-30 RX ORDER — POTASSIUM CHLORIDE 20 MEQ/1
40 TABLET, EXTENDED RELEASE ORAL ONCE
Status: COMPLETED | OUTPATIENT
Start: 2023-07-30 | End: 2023-07-30

## 2023-07-30 RX ORDER — KETOROLAC TROMETHAMINE 30 MG/ML
15 INJECTION, SOLUTION INTRAMUSCULAR; INTRAVENOUS ONCE
Status: COMPLETED | OUTPATIENT
Start: 2023-07-30 | End: 2023-07-30

## 2023-07-30 RX ADMIN — SODIUM CHLORIDE 1000 ML: 9 INJECTION, SOLUTION INTRAVENOUS at 22:14

## 2023-07-30 RX ADMIN — ONDANSETRON 4 MG: 2 INJECTION INTRAMUSCULAR; INTRAVENOUS at 22:14

## 2023-07-30 RX ADMIN — POTASSIUM CHLORIDE 40 MEQ: 1500 TABLET, EXTENDED RELEASE ORAL at 23:39

## 2023-07-30 RX ADMIN — KETOROLAC TROMETHAMINE 15 MG: 30 INJECTION, SOLUTION INTRAMUSCULAR; INTRAVENOUS at 22:14

## 2023-07-30 RX ADMIN — IOPAMIDOL 75 ML: 755 INJECTION, SOLUTION INTRAVENOUS at 23:16

## 2023-07-30 ASSESSMENT — PAIN SCALES - GENERAL
PAINLEVEL_OUTOF10: 9
PAINLEVEL_OUTOF10: 9

## 2023-07-30 ASSESSMENT — PAIN DESCRIPTION - LOCATION
LOCATION: ABDOMEN;FLANK
LOCATION: RIB CAGE

## 2023-07-30 ASSESSMENT — PAIN DESCRIPTION - ONSET: ONSET: ON-GOING

## 2023-07-30 ASSESSMENT — PAIN - FUNCTIONAL ASSESSMENT
PAIN_FUNCTIONAL_ASSESSMENT: INTOLERABLE, UNABLE TO DO ANY ACTIVE OR PASSIVE ACTIVITIES
PAIN_FUNCTIONAL_ASSESSMENT: 0-10

## 2023-07-30 ASSESSMENT — PAIN DESCRIPTION - ORIENTATION
ORIENTATION: RIGHT;LEFT;LOWER
ORIENTATION: RIGHT;LEFT

## 2023-07-30 ASSESSMENT — LIFESTYLE VARIABLES
HOW OFTEN DO YOU HAVE A DRINK CONTAINING ALCOHOL: NEVER
HOW MANY STANDARD DRINKS CONTAINING ALCOHOL DO YOU HAVE ON A TYPICAL DAY: PATIENT DOES NOT DRINK

## 2023-07-30 ASSESSMENT — PAIN DESCRIPTION - DESCRIPTORS
DESCRIPTORS: ACHING
DESCRIPTORS: STABBING

## 2023-07-30 ASSESSMENT — PAIN DESCRIPTION - FREQUENCY: FREQUENCY: INTERMITTENT

## 2023-07-31 VITALS
HEART RATE: 81 BPM | DIASTOLIC BLOOD PRESSURE: 64 MMHG | WEIGHT: 152.2 LBS | SYSTOLIC BLOOD PRESSURE: 111 MMHG | RESPIRATION RATE: 18 BRPM | TEMPERATURE: 99.3 F | OXYGEN SATURATION: 98 % | BODY MASS INDEX: 20.64 KG/M2

## 2023-07-31 LAB
BACTERIA URNS QL MICRO: ABNORMAL
BILIRUB UR QL STRIP: NEGATIVE
CLARITY UR: CLEAR
COLOR UR: YELLOW
EKG ATRIAL RATE: 86 BPM
EKG P AXIS: 68 DEGREES
EKG P-R INTERVAL: 150 MS
EKG Q-T INTERVAL: 358 MS
EKG QRS DURATION: 112 MS
EKG QTC CALCULATION (BAZETT): 428 MS
EKG R AXIS: -42 DEGREES
EKG T AXIS: 53 DEGREES
EKG VENTRICULAR RATE: 86 BPM
EPI CELLS #/AREA URNS HPF: ABNORMAL /HPF
GLUCOSE UR STRIP-MCNC: NEGATIVE MG/DL
HGB UR QL STRIP.AUTO: NEGATIVE
KETONES UR STRIP-MCNC: 40 MG/DL
LEUKOCYTE ESTERASE UR QL STRIP: NEGATIVE
NITRITE UR QL STRIP: NEGATIVE
PH UR STRIP: 6.5 [PH] (ref 5–9)
PROT UR STRIP-MCNC: ABNORMAL MG/DL
RBC #/AREA URNS HPF: ABNORMAL /HPF
SP GR UR STRIP: 1.02 (ref 1–1.03)
T4 FREE SERPL-MCNC: 2.8 NG/DL (ref 0.9–1.7)
TSH SERPL DL<=0.05 MIU/L-ACNC: <0.01 UIU/ML (ref 0.27–4.2)
UROBILINOGEN UR STRIP-ACNC: 4 EU/DL (ref 0–1)
WBC #/AREA URNS HPF: ABNORMAL /HPF

## 2023-07-31 PROCEDURE — 93010 ELECTROCARDIOGRAM REPORT: CPT | Performed by: INTERNAL MEDICINE

## 2023-07-31 RX ORDER — SUCRALFATE ORAL 1 G/10ML
1 SUSPENSION ORAL 4 TIMES DAILY
Qty: 1200 ML | Refills: 0 | Status: SHIPPED | OUTPATIENT
Start: 2023-07-31

## 2023-07-31 RX ORDER — ONDANSETRON 4 MG/1
4 TABLET, FILM COATED ORAL EVERY 8 HOURS PRN
Qty: 12 TABLET | Refills: 0 | Status: SHIPPED | OUTPATIENT
Start: 2023-07-31 | End: 2023-08-05

## 2023-07-31 RX ORDER — PANTOPRAZOLE SODIUM 40 MG/1
40 TABLET, DELAYED RELEASE ORAL
Qty: 30 TABLET | Refills: 0 | Status: SHIPPED | OUTPATIENT
Start: 2023-07-31

## 2023-07-31 NOTE — ED PROVIDER NOTES
embolism, effusions, pneumonia, dissection or acute bony fractures. Mild leukocytosis was noted on CBC, patient's D-dimer was elevated which raises concern for possible malignancy given the CT abdomen/pelvis findings of gastric wall thickening with possible ingested food versus blood clot with less likely finding of a malignancy. CT angiogram chest with PE protocol was obtained due to current cough and shortness of breath, this was negative for acute PE. Other than some hypokalemia labs are otherwise unremarkable. At the time of this dictation patient's TSH was not available. Patient was feeling much better after IV fluids, Zofran, Toradol. His potassium was repleted orally. Patient states that he does not wish to wait for his TSH to return and states he has an appointment in 2 weeks to follow-up with his PCP regarding his Graves' disease. Disposition Considerations (Tests not ordered but considered, Shared Decision Making, Pt Expectation of Test or Tx.): Shared decision-making was employed, he would like to be discharged home. I advised patient he will need to follow-up with his PCP as well as gastroenterology. He may benefit from an upper endoscopy given the gastric wall thickening. In the meantime patient be placed on Carafate 4 times a day before meals, Zofran as needed for nausea and vomiting, Protonix once daily in the mornings. Patient was given strict return to ED precautions. Patient was explicitly instructed on specific signs and symptoms on which to return to the emergency room for. Patient was instructed to return to the ER for any new or worsening symptoms. Additional discharge instructions were given verbally. All questions were answered. Patient is comfortable and agreeable with discharge plan. Patient in no acute distress and non-toxic in appearance. FINAL IMPRESSION      1. Gastric wall thickening    2. Nausea and vomiting, unspecified vomiting type    3.  Hypokalemia

## 2023-07-31 NOTE — DISCHARGE INSTRUCTIONS
You have some thickening of your gastric wall, this may be related to a gastric ulcer, you will need to schedule an appointment with a gastroenterologist.  Butler Diver will start Protonix daily in the morning before breakfast.  Carafate 4 times daily 30 minutes prior to meals. Start with a clear liquid diet tonight and slowly advance your diet. Please follow-up with gastroenterology as listed below, you may benefit from an EGD. Follow-up with your PCP regarding your thyroid studies.

## 2023-10-17 DIAGNOSIS — R00.2 PALPITATIONS: ICD-10-CM

## 2023-10-25 RX ORDER — ATORVASTATIN CALCIUM 40 MG/1
40 TABLET, FILM COATED ORAL DAILY
Qty: 30 TABLET | Refills: 5 | Status: SHIPPED | OUTPATIENT
Start: 2023-10-25

## 2023-10-25 RX ORDER — METOPROLOL SUCCINATE 50 MG/1
50 TABLET, EXTENDED RELEASE ORAL DAILY
Qty: 30 TABLET | Refills: 5 | Status: SHIPPED | OUTPATIENT
Start: 2023-10-25

## 2023-11-09 ENCOUNTER — OFFICE VISIT (OUTPATIENT)
Dept: CARDIOLOGY CLINIC | Age: 53
End: 2023-11-09

## 2023-11-09 VITALS
HEIGHT: 72 IN | HEART RATE: 84 BPM | BODY MASS INDEX: 19.5 KG/M2 | OXYGEN SATURATION: 98 % | WEIGHT: 144 LBS | DIASTOLIC BLOOD PRESSURE: 58 MMHG | SYSTOLIC BLOOD PRESSURE: 120 MMHG | RESPIRATION RATE: 20 BRPM

## 2023-11-09 DIAGNOSIS — R09.89 BILATERAL CAROTID BRUITS: ICD-10-CM

## 2023-11-09 DIAGNOSIS — I47.10 PSVT (PAROXYSMAL SUPRAVENTRICULAR TACHYCARDIA): Primary | ICD-10-CM

## 2023-11-09 ASSESSMENT — ENCOUNTER SYMPTOMS
NAUSEA: 0
DIARRHEA: 0
COUGH: 0
BLOOD IN STOOL: 0
WHEEZING: 0
VOMITING: 0
SHORTNESS OF BREATH: 1
CONSTIPATION: 0
BACK PAIN: 0
ABDOMINAL PAIN: 0

## 2023-11-09 NOTE — PROGRESS NOTES
OUTPATIENT CARDIOLOGY FOLLOW-UP    HPI:    Name: Chandler Flores    Age: 48 y.o. Primary Care Physician: Leonidas Larios DO    Date of Service: 11/9/2023    Chief Complaint:   Chief Complaint   Patient presents with    Tachycardia     6 month ov. Stress done 5/24/23. To ED x1 (non-cardiac). Labs 8/28/23. Is c/o chest pain, LLE edema & palpitations. Chest Pain    Edema    Palpitations        History of present illness :   65-year-old chronic smoker male who comes today for 6 months follow-up visit. He is accompanied by his wife. Patient was followed up by Dr. Sandra Mayen in the past.  He has history of myocardial infarction more than 10 years ago, mild aortic stenosis, right carotid stenosis, COPD, and Graves' disease. Patient continues to smoke. He can go up 15 steps with but has been complaining of chronic dyspnea on exertion and sometimes feels stabbing chest pain lasting few seconds. He feels palpitations and lightheadedness at times but denies syncope. He admits to get left lower extremity edema. EKG done today revealed sinus rhythm at 84 bpm, left atrial enlargement and bifascicular block. Review of Systems:   Review of Systems   Constitutional:  Negative for chills, fatigue and fever. HENT:  Negative for nosebleeds. Respiratory:  Positive for shortness of breath. Negative for cough and wheezing. Cardiovascular:  Positive for chest pain, palpitations and leg swelling. Gastrointestinal:  Negative for abdominal pain, blood in stool, constipation, diarrhea, nausea and vomiting. Genitourinary:  Negative for dysuria and hematuria. Musculoskeletal:  Negative for back pain, joint swelling and myalgias. Neurological:  Negative for syncope and light-headedness. Psychiatric/Behavioral:  The patient is not nervous/anxious.            Past Medical History:  Past Medical History:   Diagnosis Date    Aortic valve stenosis     Carotid stenosis, right 02/14/2019    COPD

## 2024-02-12 ENCOUNTER — HOSPITAL ENCOUNTER (OUTPATIENT)
Dept: NUCLEAR MEDICINE | Age: 54
Discharge: HOME | End: 2024-02-12
Payer: MEDICAID

## 2024-02-12 DIAGNOSIS — R94.6 NONSPECIFIC ABNORMAL RESULTS OF THYROID FUNCTION STUDY: ICD-10-CM

## 2024-02-12 PROCEDURE — 79005 NUCLEAR RX ORAL ADMIN: CPT

## 2024-02-12 PROCEDURE — 3430000000 HC RX DIAGNOSTIC RADIOPHARMACEUTICAL: Performed by: RADIOLOGY

## 2024-02-12 PROCEDURE — A9517 I131 IODIDE CAP, RX: HCPCS | Performed by: RADIOLOGY

## 2024-02-12 RX ADMIN — Medication 20000 MICRO CURIE: at 13:11

## 2024-04-27 DIAGNOSIS — R00.2 PALPITATIONS: ICD-10-CM

## 2024-04-30 RX ORDER — ATORVASTATIN CALCIUM 40 MG/1
40 TABLET, FILM COATED ORAL DAILY
Qty: 30 TABLET | Refills: 2 | Status: SHIPPED | OUTPATIENT
Start: 2024-04-30

## 2024-04-30 RX ORDER — METOPROLOL SUCCINATE 50 MG/1
50 TABLET, EXTENDED RELEASE ORAL DAILY
Qty: 30 TABLET | Refills: 2 | Status: SHIPPED | OUTPATIENT
Start: 2024-04-30

## 2024-07-21 ENCOUNTER — HOSPITAL ENCOUNTER (OUTPATIENT)
Dept: CT IMAGING | Age: 54
Discharge: HOME | End: 2024-07-23
Payer: MEDICAID

## 2024-07-21 DIAGNOSIS — Z87.891 PERSONAL HISTORY OF TOBACCO USE: ICD-10-CM

## 2024-07-21 PROCEDURE — 71271 CT THORAX LUNG CANCER SCR C-: CPT

## 2025-07-05 ENCOUNTER — HOSPITAL ENCOUNTER (EMERGENCY)
Age: 55
Discharge: HOME | End: 2025-07-05
Payer: MEDICAID

## 2025-07-05 ENCOUNTER — APPOINTMENT (OUTPATIENT)
Dept: GENERAL RADIOLOGY | Age: 55
End: 2025-07-05
Payer: MEDICAID

## 2025-07-05 VITALS
HEIGHT: 72 IN | DIASTOLIC BLOOD PRESSURE: 80 MMHG | OXYGEN SATURATION: 97 % | WEIGHT: 172 LBS | SYSTOLIC BLOOD PRESSURE: 137 MMHG | BODY MASS INDEX: 23.3 KG/M2 | TEMPERATURE: 97.9 F | HEART RATE: 102 BPM | RESPIRATION RATE: 16 BRPM
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
## 2025-07-05 DIAGNOSIS — S69.90XA FINGER INJURY, UNSPECIFIED LATERALITY, INITIAL ENCOUNTER: Primary | ICD-10-CM

## 2025-07-05 DIAGNOSIS — S61.218A LACERATION OF RING FINGER WITHOUT FOREIGN BODY WITHOUT DAMAGE TO NAIL, UNSPECIFIED LATERALITY, INITIAL ENCOUNTER: ICD-10-CM

## 2025-07-05 DIAGNOSIS — A35 TETANUS: ICD-10-CM

## 2025-07-05 PROCEDURE — 90471 IMMUNIZATION ADMIN: CPT | Performed by: NURSE PRACTITIONER

## 2025-07-05 PROCEDURE — 6360000002 HC RX W HCPCS: Performed by: NURSE PRACTITIONER

## 2025-07-05 PROCEDURE — 6370000000 HC RX 637 (ALT 250 FOR IP): Performed by: NURSE PRACTITIONER

## 2025-07-05 PROCEDURE — 90715 TDAP VACCINE 7 YRS/> IM: CPT | Performed by: NURSE PRACTITIONER

## 2025-07-05 PROCEDURE — 73140 X-RAY EXAM OF FINGER(S): CPT

## 2025-07-05 PROCEDURE — 99284 EMERGENCY DEPT VISIT MOD MDM: CPT

## 2025-07-05 RX ORDER — IBUPROFEN 400 MG/1
400 TABLET, FILM COATED ORAL ONCE
Status: COMPLETED | OUTPATIENT
Start: 2025-07-05 | End: 2025-07-05

## 2025-07-05 RX ORDER — LIDOCAINE HYDROCHLORIDE 10 MG/ML
5 INJECTION, SOLUTION INFILTRATION; PERINEURAL ONCE
Status: COMPLETED | OUTPATIENT
Start: 2025-07-05 | End: 2025-07-05

## 2025-07-05 RX ORDER — DOXYCYCLINE 100 MG/1
100 CAPSULE ORAL ONCE
Status: COMPLETED | OUTPATIENT
Start: 2025-07-05 | End: 2025-07-05

## 2025-07-05 RX ORDER — OXYCODONE HYDROCHLORIDE 5 MG/1
5 TABLET ORAL ONCE
Refills: 0 | Status: COMPLETED | OUTPATIENT
Start: 2025-07-05 | End: 2025-07-05

## 2025-07-05 RX ORDER — DOXYCYCLINE HYCLATE 100 MG
100 TABLET ORAL 2 TIMES DAILY
Qty: 14 TABLET | Refills: 0 | Status: SHIPPED | OUTPATIENT
Start: 2025-07-05 | End: 2025-07-12

## 2025-07-05 RX ADMIN — IBUPROFEN 400 MG: 400 TABLET, FILM COATED ORAL at 14:46

## 2025-07-05 RX ADMIN — LIDOCAINE HYDROCHLORIDE 5 ML: 10 INJECTION, SOLUTION INFILTRATION; PERINEURAL at 14:46

## 2025-07-05 RX ADMIN — DOXYCYCLINE HYCLATE 100 MG: 100 CAPSULE ORAL at 14:46

## 2025-07-05 RX ADMIN — TETANUS TOXOID, REDUCED DIPHTHERIA TOXOID AND ACELLULAR PERTUSSIS VACCINE, ADSORBED 0.5 ML: 5; 2.5; 8; 8; 2.5 SUSPENSION INTRAMUSCULAR at 14:46

## 2025-07-05 RX ADMIN — OXYCODONE 5 MG: 5 TABLET ORAL at 14:46

## 2025-07-05 ASSESSMENT — PAIN DESCRIPTION - ONSET: ONSET: ON-GOING

## 2025-07-05 ASSESSMENT — PAIN DESCRIPTION - DESCRIPTORS: DESCRIPTORS: TENDER;SORE

## 2025-07-05 ASSESSMENT — PAIN DESCRIPTION - LOCATION: LOCATION: FINGER (COMMENT WHICH ONE)

## 2025-07-05 ASSESSMENT — PAIN DESCRIPTION - ORIENTATION: ORIENTATION: LEFT

## 2025-07-05 ASSESSMENT — PAIN SCALES - GENERAL: PAINLEVEL_OUTOF10: 2

## 2025-07-05 ASSESSMENT — PAIN - FUNCTIONAL ASSESSMENT
PAIN_FUNCTIONAL_ASSESSMENT: PREVENTS OR INTERFERES SOME ACTIVE ACTIVITIES AND ADLS
PAIN_FUNCTIONAL_ASSESSMENT: 0-10

## 2025-07-05 ASSESSMENT — PAIN DESCRIPTION - PAIN TYPE: TYPE: ACUTE PAIN

## 2025-07-05 ASSESSMENT — PAIN DESCRIPTION - FREQUENCY: FREQUENCY: CONTINUOUS

## 2025-07-05 NOTE — ED PROVIDER NOTES
Independent DORITA Visit.        [facility] EMERGENCY DEPARTMENT  ED  Encounter Note  Admit Date/RoomTime: 2025  2:34 PM  ED Room: [facility]  NAME: Pa Good  : 1970  MRN: [id number]  PCP: Osvaldo Lake III, DO    CHIEF COMPLAINT     Laceration (Sustained 3 cm laceration to Lt small finger while putting trampoline together)    HISTORY OF PRESENT ILLNESS        Pa Good is a 55 y.o. male who presents to the ED with complaints AV skin avulsion to the middle phalanx of the left pinky finger.  Patient states occurred today while putting together a trampoline.  Patient states his left little finger was caught between 2 pipes while putting them together causing a skin avulsion to the palmar aspect of the left pinky finger.  Skin avulsion approximately 1.5 cm in length by 0.5 cm in width by 2 mm in depth.  Minimal bleeding at present.  Patient able to flex and extend the left little finger without difficulty.  Patient states he does not know when his last tetanus shot was.  Patient denies any other injuries.    REVIEW OF SYSTEMS     Pertinent positives and negatives are stated within HPI, all other systems reviewed and are negative.    Past Medical History:  has a past medical history of Aortic valve stenosis, Carotid stenosis, right, COPD (chronic obstructive pulmonary disease) (HCC), Graves disease, History of tobacco use, PSVT (paroxysmal supraventricular tachycardia), and Tobacco dependence.  Surgical History:  has a past surgical history that includes Tonsillectomy.  Social History:  reports that he has quit smoking. His smoking use included cigarettes. He has a 35 pack-year smoking history. He has been exposed to tobacco smoke. He quit smokeless tobacco use about 5 years ago.  His smokeless tobacco use included chew. He reports that he does not currently use alcohol. He reports that he does not use drugs.  Family History: family history includes Heart Attack (age of  recognition software. Every effort was made to ensure accuracy; however, inadvertent computerized transcription errors may be present.  END OF ED PROVIDER NOTE

## 2025-07-05 NOTE — DISCHARGE INSTRUCTIONS
Leave current bandage in place for 2 days.  After 2 days remove bandage clean area with mild soap and water reapply antibiotic ointment and keep covered with a bandage until skin avulsion has healed.  Motrin or Tylenol as needed for pain.

## 2025-07-05 NOTE — ED PROVIDER NOTES
Department of Emergency Medicine  FIRST PROVIDER TRIAGE NOTE             Independent MLP           7/5/25  2:16 PM EDT    Date of Encounter: 7/5/25   MRN: [id number]      HPI: Pa Good is a 55 y.o. male who presents to the ED for Laceration (Sustained 3 cm laceration to Lt small finger while putting trampoline together)   Just happened      ROS: Negative for cp or sob.    PE: Gen Appearance/Constitutional: alert  HEENT: NC/NT. PERRLA,  Airway patent.  Neck: supple  CV: regular rate  Pulm: CTA bilat  GI: soft and NT  Musculoskeletal: moves all extremities x 4  Lymphatics: no edema  Lac to left finger with active bleeding  Poor hand hygiene     Initial Plan of Care: All treatment areas with department are currently occupied. Plan to order/Initiate the following while awaiting opening in ED: .  Initiate Treatment-Testing, Proceed toTreatment Area When Bed Available for ED Attending/MLP to Continue Care    Electronically signed by DANDY Florez CNP   DD: 7/5/25       Talia Saldana APRN - CNP  07/05/25 [id number]